# Patient Record
Sex: FEMALE | Race: WHITE
[De-identification: names, ages, dates, MRNs, and addresses within clinical notes are randomized per-mention and may not be internally consistent; named-entity substitution may affect disease eponyms.]

---

## 2018-10-05 ENCOUNTER — HOSPITAL ENCOUNTER (OUTPATIENT)
Dept: HOSPITAL 59 - HOP | Age: 38
Discharge: HOME | End: 2018-10-05
Attending: INTERNAL MEDICINE
Payer: COMMERCIAL

## 2018-10-05 DIAGNOSIS — F32.9: ICD-10-CM

## 2018-10-05 DIAGNOSIS — Z87.19: ICD-10-CM

## 2018-10-05 DIAGNOSIS — Z12.11: Primary | ICD-10-CM

## 2018-10-05 PROCEDURE — 00812 ANES LWR INTST SCR COLSC: CPT

## 2018-10-05 PROCEDURE — 81025 URINE PREGNANCY TEST: CPT

## 2018-10-25 NOTE — OPERATIVE NOTE
DATE OF SERVICE:  10/05/2018.



DATE OF SURGERY:  10/05/2018.



REQUESTING PROVIDER:  Johnie Martínez DO.



Surgeon:  Bryant Tafoya MD.



POSTOPERATIVE DIAGNOSIS:  Normal colon, mucosa with no new masses or lesions.



OPERATION:  COLONOSCOPY.



Indication for Procedure:  This is a 38-year-old female with history of constipation and 
intermittent periods of rectal pain, who presented for colonoscopy.



SEDATION:  Sedation is per Anesthesia.  Pulse oximetry was monitored throughout the duration of the 
procedure to maintain O2 saturation of 90% or greater.  Supplemental oxygen was administered via 
nasal cannula.  Cardiac and vital signs were monitored throughout the duration of the procedure and 
they were stable. 



PROCEDURE:  The procedure of colonoscopy, risks and alternatives to the procedure, including the 
risks of bleeding and perforation among others, were explained to the patient.  She voiced 
understanding and agrees to have the procedure done.  Physical examination was performed and the 
patient was found stable for sedation. The patient was then placed in the left lateral position and 
sedation was initiated.  Digital rectal exam was performed and showed small external hemorrhoids 
with no palpable rectal masses.  A lubricated Olympus [id number] colonoscope was then inserted into 
the rectum and under direct visualization was advanced to the cecum without difficulty.  The 
ileocecal valve and appendiceal orifice were identified and photographed.  The colonic mucosa was 
carefully examined upon introduction of the colonoscope.  There were no lesions noted.  The bowel 
preparation was excellent.  The ileocecal valve was intubated and terminal ileal mucosa was 
inspected for about 10 cm, and it appeared normal.  The colonoscope was then withdrawn very 
carefully, re-examining the colonic mucosal surfaces.  No other lesions were noted.  In the rectum, 
retroflexion maneuver was performed and there were no lesions noted.  The colonoscope was then 
withdrawn and the procedure was terminated.  The patient tolerated the procedure well, without 
immediate complications.  She remained in stable vital signs and was transferred into the Recovery 
Room. 



PLAN AND RECOMMENDATIONS:

1.  Patient is to continue on a high-fiber diet.

2.  She is to drink fluids, especially water, up to 64 ounces a day. 

3.  I would be happy to see her back in the office as needed.  In the meantime, she is to have a 
repeat colonoscopy when she turns 49.

Thank you for allowing me to participate in the care of your patient. CC: MD Johnie Morrison DO

[facility]